# Patient Record
Sex: MALE | Race: WHITE | NOT HISPANIC OR LATINO | Employment: FULL TIME | URBAN - METROPOLITAN AREA
[De-identification: names, ages, dates, MRNs, and addresses within clinical notes are randomized per-mention and may not be internally consistent; named-entity substitution may affect disease eponyms.]

---

## 2023-12-15 ENCOUNTER — OFFICE VISIT (OUTPATIENT)
Dept: ORTHOPEDIC SURGERY | Facility: HOSPITAL | Age: 33
End: 2023-12-15
Payer: COMMERCIAL

## 2023-12-15 ENCOUNTER — HOSPITAL ENCOUNTER (OUTPATIENT)
Dept: RADIOLOGY | Facility: EXTERNAL LOCATION | Age: 33
Discharge: HOME | End: 2023-12-15

## 2023-12-15 DIAGNOSIS — M25.511 ACUTE PAIN OF RIGHT SHOULDER: Primary | ICD-10-CM

## 2023-12-15 PROCEDURE — 99214 OFFICE O/P EST MOD 30 MIN: CPT | Performed by: ORTHOPAEDIC SURGERY

## 2023-12-15 PROCEDURE — 99204 OFFICE O/P NEW MOD 45 MIN: CPT | Performed by: ORTHOPAEDIC SURGERY

## 2023-12-15 NOTE — PROGRESS NOTES
R torn pectoral 2nd opinion   Very pleasant right-hand-dominant 33-year-old male who works out 6 days a week and felt a pop approximately 1 month ago.  He was diagnosed with a pectoralis major rupture.  He underwent surgery on Tuesday.  Unfortunately he was told by his original surgeon that was not repairable and they did not have graft at that time.  He was given options of living with that considering revision with a possible allograft.  He is here today to talk more about that.  He has no numbness tingling.  His priorities are working out.  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    Physical examination examination of the left shoulder today reveals the skin to be intact. There is no sign any atrophy lesions or abrasions. There is no pain to palpation of the bony prominences. Cervical lymphadenopathy examined, and this was negative.    Patient had 5 out of 5 wrist flexors extension thumb extension bilaterally. Sensation was intact to light touch to median ulnar radial axillary and musculocutaneous nerves bilaterally. Positive radial pulse bilaterally. Provocative maneuvers on the left side today were negative.    Range of motion of the left shoulder revealed 0-170° of forward elevation. 0-60° of external rotation. Internal rotation was to T 12.  Examination of the right shoulder reveals a incision in the axilla.  He has got significant bruising around that area.  Neurologically intact very muscular gentleman.  Because of all the postop bruising is very difficult to assess cosmesis.    X-rays are within normal limits    Assessment and plan patient with a subacute pectoralis major rupture who underwent an unsuccessful surgery earlier this week here for second opinion    This point we a long discussion about his options.  He does have an option of living with it.  He understands cosmesis and strength is unlikely to be where he would like it to be.  We cannot make him a normal shoulder at this point.  I think a second surgery  to consider either primary repair or repair with allograft is a reasonable option.  I do not know the timeline of this.  I do not have availability in the next week.  I did let him know that I would work with my partners to determine if there is anyone that was available to consider this.  He understands all his options.  He understands the risk of infection bleeding nerve injury failure of the pectoralis major repair.  Will do our very best to see what we can do for him.  Plan at this point will be for right pectoralis major repair with possible allograft.

## 2023-12-19 DIAGNOSIS — M25.511 ACUTE PAIN OF RIGHT SHOULDER: Primary | ICD-10-CM

## 2023-12-19 NOTE — PROGRESS NOTES
Reviewed needs mri of chest stat to assess his pec major that had an attempted repair for surgery next week

## 2023-12-20 NOTE — PROGRESS NOTES
CHIEF COMPLAINT:   Chief Complaint   Patient presents with    Right Shoulder - Follow-up     MRI review     History: 33 y.o. male presents to the office today for evaluation of his right shoulder.  The patient is right-hand dominant.  He works a sedentary job.  However, he is extremely active, goes to the gym 6 days a week.  This is very important to the patient and part of his livelihood.  The patient unfortunately sustained an injury to his right pectoralis tendon 1 month ago.  He actually underwent surgery with a different surgeon 1-1/2 weeks ago.  Unfortunately, at the time of surgery, the surgeon deemed that the tear requiring allograft, which she unfortunately did not have at the time of surgery.  Therefore, the surgeon closed the incision and transferred his care to us.  The patient has had significant bruising in the area of surgery, but has been working on range of motion of the shoulder.  He does have a deformity in his chest and has pain over the incision.  He is otherwise healthy.    Past medical history, past surgical history, medications, allergies, family history, social history, and review of systems were reviewed today.    A 12 point review of systems was negative other than as stated in the HPI.    History reviewed. No pertinent past medical history.     No Known Allergies     History reviewed. No pertinent surgical history.     No family history on file.     Social History     Socioeconomic History    Marital status: Unknown     Spouse name: Not on file    Number of children: Not on file    Years of education: Not on file    Highest education level: Not on file   Occupational History    Not on file   Tobacco Use    Smoking status: Never     Passive exposure: Never    Smokeless tobacco: Never   Substance and Sexual Activity    Alcohol use: Not on file    Drug use: Not on file    Sexual activity: Not on file   Other Topics Concern    Not on file   Social History Narrative    Not on file     Social  Determinants of Health     Financial Resource Strain: Not on file   Food Insecurity: Not on file   Transportation Needs: Not on file   Physical Activity: Not on file   Stress: Not on file   Social Connections: Not on file   Intimate Partner Violence: Not on file   Housing Stability: Not on file        CURRENT MEDICATIONS:   No current outpatient medications on file.     No current facility-administered medications for this visit.       Physical Examination:       No data to display               There is no height or weight on file to calculate BMI.    Well-appearing, appears stated age, pleasant and cooperative, appropriate mood and behavior. Height and weight reviewed. Alert and oriented x3.  Auditory function intact.  No acute distress.  Intact ocular function, ABILIO, EOMI. Breathing is unlabored .  There is no evidence of jugular venous distension. Skin appearance is normal without evidence of rash or other lesions. 2+ radial pulses bilaterally, fingers pink and wwp, good capillary refill, no pitting edema. No appreciable lymphadenopathy in bilateral upper extremities. SILT throughout both upper extremities, median/radial/ulnar/musculocutaneous/axillary nerve motor and sensory intact (except for abnormalities noted in focused musculoskeletal exam section below).     Neck exam: Full range of motion of the neck in flexion/extension and rotational movements. No significant areas of tenderness to palpation in the neck.    On exam of bilateral upper extremities, obvious deformity of the right pectoralis muscle bulk compared to the left side with a drop nipple sign.  The contour of the pectoralis muscle in the armpit is still palpable, but not as firm as the contralateral side.  Significant bruising throughout the arm.  Prior incision near the axilla is well-healed with some dried skin glue over top.  Demonstrates full range of motion of the shoulder today.    Imaging: Chest MRI was reviewed today.  Personally  interpreted by myself.  There is evidence for a complex tear of the pectoralis major.  I do believe that the clavicular head of the pectoralis major tendon is still intact, and this is just a sternal head tear.  There is significant edema throughout the chest.  The tear does not appear to be musculotendinous in nature.  The other structures of the shoulder are intact.    Assessment: Right subacute pectoralis major tendon tear    Plan: I had a very long discussion with the patient about treatment options diagnosis.  He is in a very unfortunate situation and that he is 4 weeks out from injury now, and already had an attempted repair for his pectoralis tendon, but this was unable to be done, due to the prior surgeon not having allograft available.  He examines with the pectoralis deficiency today and MRI confirms this.  I discussed with the patient that this is a very complicated situation.  I discussed that revision surgery is reasonable, and we will have allograft available.  I do think that there are 3 possible outcomes at time of surgery.  Will either be able to do a direct repair, we do need to do a repair with allograft, or he may have a truly irreparable pectoralis tear.  I did state that there are some musculotendinous junction tears of the pectoralis that really cannot be adequately fixed with suture, and unable to be repaired.  However, I am hopeful that we will able to do either a direct repair or a repair with allograft.  We will be ready for all possibilities.  Given the patient's activity level, I do think that he will do best with the repair, versus nonoperative management.  I did state that after this surgery if we are not able to get it repaired, then this will be deemed a truly not fixable tear.  He voiced understanding of all this and wished to proceed.    Risks and recovery after surgery were discussed.  The proposed procedure will be a reverse shoulder arthroplasty.  Risks of surgery include  bleeding, infection, failure to achieve repair, neurovascular injury, persistent pain, implant failure, wound complications and possible need for further surgery. All surgeries that are performed under anesthesia also have the risks of DVTs, pulmonary embolism, potentially death. Per anesthesia's evaluation, the patient will have a nerve block, the risks of which the anesthesia team will discuss with the patient.   Patient voiced understanding of these risks and wished to proceed.     The patient will need PATs which will also include a CBC, BMP, PT/INR and a full work up by the PAT team as well as anesthesia evaluation.  My office will work to get this scheduled. I will see them back 2 weeks after surgery.    Dragon software was used to dictate this note, please be aware that minor errors in transcription may be present.    Ike Merlos MD    Shoulder/Elbow Surgery  [facility]

## 2023-12-21 ENCOUNTER — HOSPITAL ENCOUNTER (OUTPATIENT)
Dept: RADIOLOGY | Facility: HOSPITAL | Age: 33
Discharge: HOME | End: 2023-12-21
Payer: COMMERCIAL

## 2023-12-21 DIAGNOSIS — M25.511 ACUTE PAIN OF RIGHT SHOULDER: ICD-10-CM

## 2023-12-21 PROCEDURE — 71550 MRI CHEST W/O DYE: CPT | Performed by: RADIOLOGY

## 2023-12-21 PROCEDURE — 71550 MRI CHEST W/O DYE: CPT

## 2023-12-22 ENCOUNTER — OFFICE VISIT (OUTPATIENT)
Dept: ORTHOPEDIC SURGERY | Facility: CLINIC | Age: 33
End: 2023-12-22
Payer: COMMERCIAL

## 2023-12-22 ENCOUNTER — PREP FOR PROCEDURE (OUTPATIENT)
Dept: ORTHOPEDIC SURGERY | Facility: CLINIC | Age: 33
End: 2023-12-22

## 2023-12-22 DIAGNOSIS — S29.011A TRAUMATIC RUPTURE OF RIGHT PECTORALIS MAJOR TENDON: Primary | ICD-10-CM

## 2023-12-22 PROCEDURE — 99214 OFFICE O/P EST MOD 30 MIN: CPT | Performed by: STUDENT IN AN ORGANIZED HEALTH CARE EDUCATION/TRAINING PROGRAM

## 2023-12-22 PROCEDURE — 1036F TOBACCO NON-USER: CPT | Performed by: STUDENT IN AN ORGANIZED HEALTH CARE EDUCATION/TRAINING PROGRAM

## 2023-12-22 ASSESSMENT — PAIN SCALES - GENERAL: PAINLEVEL_OUTOF10: 3

## 2023-12-22 ASSESSMENT — PAIN - FUNCTIONAL ASSESSMENT: PAIN_FUNCTIONAL_ASSESSMENT: 0-10

## 2023-12-22 ASSESSMENT — PAIN DESCRIPTION - DESCRIPTORS: DESCRIPTORS: ACHING;SORE

## 2023-12-26 ENCOUNTER — ANESTHESIA EVENT (OUTPATIENT)
Dept: OPERATING ROOM | Facility: HOSPITAL | Age: 33
End: 2023-12-26
Payer: COMMERCIAL

## 2023-12-27 RX ORDER — TRANEXAMIC ACID 100 MG/ML
1000 INJECTION, SOLUTION INTRAVENOUS ONCE
Status: CANCELLED | OUTPATIENT
Start: 2023-12-27 | End: 2023-12-27

## 2023-12-27 NOTE — DISCHARGE INSTRUCTIONS
Shoulder and Elbow Service  Ike Merlos MD    Discharge Instructions after Open Shoulder Surgery      You have a bandage over your shoulder. DO NOT REMOVE THIS BANDAGE.     Remain in your sling at all times. This includes sleeping in your sling. *You may come out of your sling for daily exercises only.     Do not actively move your shoulder during this time. Active reaching and lifting away from the body are NOT permitted.    You may use the operative arm for activities of daily living that do not require the  operative arm to leave the side of the body. Such as: eating, drinking and bathing.    Remain non-weight bearing with the operative arm until you are seen post operatively.    Remove your arm from the sling to perform active elbow, wrist and hand range of motion exercises 4-5x per day. This will help alleviate joint stiffness and swelling in your arm and hand.    Use cryotherapy machine or ice on the shoulder intermittently over the first 72 hours up to the first 2 weeks following surgery.   Pain medication has been prescribed for you. Use your medicine liberally over the first 72 hours and only take if you are experiencing pain. You can then begin to taper your        use. You may take Extra Strength Tylenol or Tylenol only in place of the pain pills.   *DO NOT TAKE ANY nonsteroidal anti-inflammatory pain medications: Advil, Motrin, Ibuprofen, Aleve, Naproxen or Naprosyn. *UNLESS PRESCRIBED   You may shower after surgery. The dressing is waterproof.   Do not scrub the dressing, just let water run over it. Pat it dry.    Aspirin has been prescribed to prevent blood clots. Take one aspirin (81 mg) tablet once per day for 2 weeks after surgery, unless you have an aspirin sensitivity or allergy,      asthma or are on blood thinners. If Coumadin, Plavix, Xarelto or other blood thinning medication is prescribed for blood clot prevention, take this medication as directed by        your medical clearance  physician.   *DO NOT TAKE ASPRIN AND ANOTHER BLOOD THINNER!!    Please call the office at [phone] for any problems. Including the following:  - Excessive redness of the incision  - ANY drainage at or around incision  - Fever of more than 101.5 F    Please call [phone] to make a follow-up appointment to be seen at the [facility] or [place] [facility]. You should see Dr Merlos 10-14 days after your surgical procedure.     [facility]   [place] [facility]  [address] [place] [address]  [phone]

## 2023-12-28 ENCOUNTER — ANESTHESIA (OUTPATIENT)
Dept: OPERATING ROOM | Facility: HOSPITAL | Age: 33
End: 2023-12-28
Payer: COMMERCIAL

## 2023-12-28 ENCOUNTER — HOSPITAL ENCOUNTER (OUTPATIENT)
Facility: HOSPITAL | Age: 33
Setting detail: OUTPATIENT SURGERY
Discharge: HOME | End: 2023-12-28
Attending: STUDENT IN AN ORGANIZED HEALTH CARE EDUCATION/TRAINING PROGRAM | Admitting: STUDENT IN AN ORGANIZED HEALTH CARE EDUCATION/TRAINING PROGRAM
Payer: COMMERCIAL

## 2023-12-28 VITALS
OXYGEN SATURATION: 94 % | BODY MASS INDEX: 26.21 KG/M2 | HEIGHT: 73 IN | RESPIRATION RATE: 18 BRPM | WEIGHT: 197.75 LBS | HEART RATE: 74 BPM | TEMPERATURE: 97.7 F | SYSTOLIC BLOOD PRESSURE: 158 MMHG | DIASTOLIC BLOOD PRESSURE: 75 MMHG

## 2023-12-28 DIAGNOSIS — S29.011A TRAUMATIC RUPTURE OF RIGHT PECTORALIS MAJOR TENDON: Primary | ICD-10-CM

## 2023-12-28 PROCEDURE — C1713 ANCHOR/SCREW BN/BN,TIS/BN: HCPCS | Performed by: STUDENT IN AN ORGANIZED HEALTH CARE EDUCATION/TRAINING PROGRAM

## 2023-12-28 PROCEDURE — 2500000004 HC RX 250 GENERAL PHARMACY W/ HCPCS (ALT 636 FOR OP/ED): Performed by: ANESTHESIOLOGIST ASSISTANT

## 2023-12-28 PROCEDURE — 2720000007 HC OR 272 NO HCPCS: Performed by: STUDENT IN AN ORGANIZED HEALTH CARE EDUCATION/TRAINING PROGRAM

## 2023-12-28 PROCEDURE — 2500000004 HC RX 250 GENERAL PHARMACY W/ HCPCS (ALT 636 FOR OP/ED): Performed by: STUDENT IN AN ORGANIZED HEALTH CARE EDUCATION/TRAINING PROGRAM

## 2023-12-28 PROCEDURE — 3700000001 HC GENERAL ANESTHESIA TIME - INITIAL BASE CHARGE: Performed by: STUDENT IN AN ORGANIZED HEALTH CARE EDUCATION/TRAINING PROGRAM

## 2023-12-28 PROCEDURE — 2780000003 HC OR 278 NO HCPCS: Performed by: STUDENT IN AN ORGANIZED HEALTH CARE EDUCATION/TRAINING PROGRAM

## 2023-12-28 PROCEDURE — 3600000003 HC OR TIME - INITIAL BASE CHARGE - PROCEDURE LEVEL THREE: Performed by: STUDENT IN AN ORGANIZED HEALTH CARE EDUCATION/TRAINING PROGRAM

## 2023-12-28 PROCEDURE — 24341 RPR TDN/MUSC UPR A/E EACH: CPT | Performed by: STUDENT IN AN ORGANIZED HEALTH CARE EDUCATION/TRAINING PROGRAM

## 2023-12-28 PROCEDURE — 7100000009 HC PHASE TWO TIME - INITIAL BASE CHARGE: Performed by: STUDENT IN AN ORGANIZED HEALTH CARE EDUCATION/TRAINING PROGRAM

## 2023-12-28 PROCEDURE — 7100000001 HC RECOVERY ROOM TIME - INITIAL BASE CHARGE: Performed by: STUDENT IN AN ORGANIZED HEALTH CARE EDUCATION/TRAINING PROGRAM

## 2023-12-28 PROCEDURE — 3700000002 HC GENERAL ANESTHESIA TIME - EACH INCREMENTAL 1 MINUTE: Performed by: STUDENT IN AN ORGANIZED HEALTH CARE EDUCATION/TRAINING PROGRAM

## 2023-12-28 PROCEDURE — 3600000008 HC OR TIME - EACH INCREMENTAL 1 MINUTE - PROCEDURE LEVEL THREE: Performed by: STUDENT IN AN ORGANIZED HEALTH CARE EDUCATION/TRAINING PROGRAM

## 2023-12-28 PROCEDURE — 7100000002 HC RECOVERY ROOM TIME - EACH INCREMENTAL 1 MINUTE: Performed by: STUDENT IN AN ORGANIZED HEALTH CARE EDUCATION/TRAINING PROGRAM

## 2023-12-28 PROCEDURE — 7100000010 HC PHASE TWO TIME - EACH INCREMENTAL 1 MINUTE: Performed by: STUDENT IN AN ORGANIZED HEALTH CARE EDUCATION/TRAINING PROGRAM

## 2023-12-28 PROCEDURE — 2500000005 HC RX 250 GENERAL PHARMACY W/O HCPCS: Performed by: ANESTHESIOLOGIST ASSISTANT

## 2023-12-28 DEVICE — IMPLANTABLE DEVICE: Type: IMPLANTABLE DEVICE | Site: SHOULDER | Status: FUNCTIONAL

## 2023-12-28 DEVICE — SP FIBERTAK RC, DBLOAD TAPE BL/W, BLK/W
Type: IMPLANTABLE DEVICE | Site: SHOULDER | Status: FUNCTIONAL
Brand: ARTHREX®

## 2023-12-28 RX ORDER — MIDAZOLAM HYDROCHLORIDE 1 MG/ML
2 INJECTION, SOLUTION INTRAMUSCULAR; INTRAVENOUS ONCE
Status: COMPLETED | OUTPATIENT
Start: 2023-12-28 | End: 2023-12-28

## 2023-12-28 RX ORDER — HYDRALAZINE HYDROCHLORIDE 20 MG/ML
5 INJECTION INTRAMUSCULAR; INTRAVENOUS EVERY 30 MIN PRN
Status: DISCONTINUED | OUTPATIENT
Start: 2023-12-28 | End: 2023-12-28 | Stop reason: HOSPADM

## 2023-12-28 RX ORDER — LABETALOL HYDROCHLORIDE 5 MG/ML
5 INJECTION, SOLUTION INTRAVENOUS ONCE AS NEEDED
Status: DISCONTINUED | OUTPATIENT
Start: 2023-12-28 | End: 2023-12-28 | Stop reason: HOSPADM

## 2023-12-28 RX ORDER — DEXAMETHASONE SODIUM PHOSPHATE 4 MG/ML
INJECTION, SOLUTION INTRA-ARTICULAR; INTRALESIONAL; INTRAMUSCULAR; INTRAVENOUS; SOFT TISSUE AS NEEDED
Status: DISCONTINUED | OUTPATIENT
Start: 2023-12-28 | End: 2023-12-28

## 2023-12-28 RX ORDER — PROPOFOL 10 MG/ML
INJECTION, EMULSION INTRAVENOUS AS NEEDED
Status: DISCONTINUED | OUTPATIENT
Start: 2023-12-28 | End: 2023-12-28

## 2023-12-28 RX ORDER — LIDOCAINE HYDROCHLORIDE 10 MG/ML
INJECTION, SOLUTION EPIDURAL; INFILTRATION; INTRACAUDAL; PERINEURAL AS NEEDED
Status: DISCONTINUED | OUTPATIENT
Start: 2023-12-28 | End: 2023-12-28

## 2023-12-28 RX ORDER — FENTANYL CITRATE 50 UG/ML
INJECTION, SOLUTION INTRAMUSCULAR; INTRAVENOUS AS NEEDED
Status: DISCONTINUED | OUTPATIENT
Start: 2023-12-28 | End: 2023-12-28

## 2023-12-28 RX ORDER — PROCHLORPERAZINE EDISYLATE 5 MG/ML
5 INJECTION INTRAMUSCULAR; INTRAVENOUS ONCE AS NEEDED
Status: DISCONTINUED | OUTPATIENT
Start: 2023-12-28 | End: 2023-12-28 | Stop reason: HOSPADM

## 2023-12-28 RX ORDER — SODIUM CHLORIDE, SODIUM LACTATE, POTASSIUM CHLORIDE, CALCIUM CHLORIDE 600; 310; 30; 20 MG/100ML; MG/100ML; MG/100ML; MG/100ML
100 INJECTION, SOLUTION INTRAVENOUS CONTINUOUS
Status: DISCONTINUED | OUTPATIENT
Start: 2023-12-28 | End: 2023-12-28 | Stop reason: HOSPADM

## 2023-12-28 RX ORDER — ONDANSETRON HYDROCHLORIDE 2 MG/ML
INJECTION, SOLUTION INTRAVENOUS AS NEEDED
Status: DISCONTINUED | OUTPATIENT
Start: 2023-12-28 | End: 2023-12-28

## 2023-12-28 RX ORDER — ALBUTEROL SULFATE 0.83 MG/ML
2.5 SOLUTION RESPIRATORY (INHALATION) ONCE AS NEEDED
Status: DISCONTINUED | OUTPATIENT
Start: 2023-12-28 | End: 2023-12-28 | Stop reason: HOSPADM

## 2023-12-28 RX ORDER — ASPIRIN 81 MG/1
81 TABLET ORAL DAILY
Qty: 14 TABLET | Refills: 0 | Status: SHIPPED | OUTPATIENT
Start: 2023-12-28 | End: 2024-01-11

## 2023-12-28 RX ORDER — ROCURONIUM BROMIDE 10 MG/ML
INJECTION, SOLUTION INTRAVENOUS AS NEEDED
Status: DISCONTINUED | OUTPATIENT
Start: 2023-12-28 | End: 2023-12-28

## 2023-12-28 RX ORDER — OXYCODONE HYDROCHLORIDE 5 MG/1
5 TABLET ORAL EVERY 6 HOURS PRN
Qty: 28 TABLET | Refills: 0 | Status: SHIPPED | OUTPATIENT
Start: 2023-12-28 | End: 2024-01-04

## 2023-12-28 RX ORDER — NEOSTIGMINE METHYLSULFATE 1 MG/ML
INJECTION, SOLUTION INTRAVENOUS AS NEEDED
Status: DISCONTINUED | OUTPATIENT
Start: 2023-12-28 | End: 2023-12-28

## 2023-12-28 RX ORDER — FENTANYL CITRATE 50 UG/ML
25 INJECTION, SOLUTION INTRAMUSCULAR; INTRAVENOUS EVERY 5 MIN PRN
Status: DISCONTINUED | OUTPATIENT
Start: 2023-12-28 | End: 2023-12-28 | Stop reason: HOSPADM

## 2023-12-28 RX ORDER — VANCOMYCIN HYDROCHLORIDE 1 G/20ML
INJECTION, POWDER, LYOPHILIZED, FOR SOLUTION INTRAVENOUS AS NEEDED
Status: DISCONTINUED | OUTPATIENT
Start: 2023-12-28 | End: 2023-12-28 | Stop reason: HOSPADM

## 2023-12-28 RX ORDER — MEPERIDINE HYDROCHLORIDE 25 MG/ML
12.5 INJECTION INTRAMUSCULAR; INTRAVENOUS; SUBCUTANEOUS EVERY 10 MIN PRN
Status: DISCONTINUED | OUTPATIENT
Start: 2023-12-28 | End: 2023-12-28 | Stop reason: HOSPADM

## 2023-12-28 RX ORDER — ACETAMINOPHEN 500 MG
1000 TABLET ORAL EVERY 6 HOURS PRN
Qty: 30 TABLET | Refills: 2 | Status: SHIPPED | OUTPATIENT
Start: 2023-12-28 | End: 2024-01-08

## 2023-12-28 RX ORDER — DIPHENHYDRAMINE HYDROCHLORIDE 50 MG/ML
12.5 INJECTION INTRAMUSCULAR; INTRAVENOUS ONCE AS NEEDED
Status: DISCONTINUED | OUTPATIENT
Start: 2023-12-28 | End: 2023-12-28 | Stop reason: HOSPADM

## 2023-12-28 RX ORDER — FENTANYL CITRATE 50 UG/ML
50 INJECTION, SOLUTION INTRAMUSCULAR; INTRAVENOUS ONCE
Status: COMPLETED | OUTPATIENT
Start: 2023-12-28 | End: 2023-12-28

## 2023-12-28 RX ORDER — IPRATROPIUM BROMIDE 0.5 MG/2.5ML
500 SOLUTION RESPIRATORY (INHALATION) AS NEEDED
Status: DISCONTINUED | OUTPATIENT
Start: 2023-12-28 | End: 2023-12-28 | Stop reason: HOSPADM

## 2023-12-28 RX ORDER — ONDANSETRON HYDROCHLORIDE 2 MG/ML
4 INJECTION, SOLUTION INTRAVENOUS ONCE AS NEEDED
Status: DISCONTINUED | OUTPATIENT
Start: 2023-12-28 | End: 2023-12-28 | Stop reason: HOSPADM

## 2023-12-28 RX ORDER — CEFAZOLIN SODIUM 2 G/100ML
2 INJECTION, SOLUTION INTRAVENOUS ONCE
Status: COMPLETED | OUTPATIENT
Start: 2023-12-28 | End: 2023-12-28

## 2023-12-28 RX ORDER — GLYCOPYRROLATE 0.2 MG/ML
INJECTION INTRAMUSCULAR; INTRAVENOUS AS NEEDED
Status: DISCONTINUED | OUTPATIENT
Start: 2023-12-28 | End: 2023-12-28

## 2023-12-28 RX ORDER — FENTANYL CITRATE 50 UG/ML
50 INJECTION, SOLUTION INTRAMUSCULAR; INTRAVENOUS EVERY 5 MIN PRN
Status: DISCONTINUED | OUTPATIENT
Start: 2023-12-28 | End: 2023-12-28 | Stop reason: HOSPADM

## 2023-12-28 RX ADMIN — CEFAZOLIN SODIUM 2 G: 2 INJECTION, SOLUTION INTRAVENOUS at 09:36

## 2023-12-28 RX ADMIN — GLYCOPYRROLATE 0.4 MG: 0.2 INJECTION INTRAMUSCULAR; INTRAVENOUS at 11:22

## 2023-12-28 RX ADMIN — FENTANYL CITRATE 50 MCG: 50 INJECTION INTRAMUSCULAR; INTRAVENOUS at 09:17

## 2023-12-28 RX ADMIN — NEOSTIGMINE METHYLSULFATE 3 MG: 1 INJECTION INTRAVENOUS at 11:22

## 2023-12-28 RX ADMIN — ONDANSETRON 4 MG: 2 INJECTION INTRAMUSCULAR; INTRAVENOUS at 11:13

## 2023-12-28 RX ADMIN — DEXAMETHASONE SODIUM PHOSPHATE 4 MG: 4 INJECTION, SOLUTION INTRAMUSCULAR; INTRAVENOUS at 09:49

## 2023-12-28 RX ADMIN — ROCURONIUM BROMIDE 5 MG: 10 INJECTION, SOLUTION INTRAVENOUS at 11:01

## 2023-12-28 RX ADMIN — FENTANYL CITRATE 50 MCG: 50 INJECTION INTRAMUSCULAR; INTRAVENOUS at 11:13

## 2023-12-28 RX ADMIN — ROCURONIUM BROMIDE 10 MG: 10 INJECTION, SOLUTION INTRAVENOUS at 10:21

## 2023-12-28 RX ADMIN — MIDAZOLAM 2 MG: 1 INJECTION INTRAMUSCULAR; INTRAVENOUS at 09:17

## 2023-12-28 RX ADMIN — FENTANYL CITRATE 25 MCG: 50 INJECTION INTRAMUSCULAR; INTRAVENOUS at 11:20

## 2023-12-28 RX ADMIN — ROCURONIUM BROMIDE 50 MG: 10 INJECTION, SOLUTION INTRAVENOUS at 09:34

## 2023-12-28 RX ADMIN — LIDOCAINE HYDROCHLORIDE 5 ML: 10 INJECTION, SOLUTION EPIDURAL; INFILTRATION; INTRACAUDAL; PERINEURAL at 09:34

## 2023-12-28 RX ADMIN — SODIUM CHLORIDE, SODIUM LACTATE, POTASSIUM CHLORIDE, AND CALCIUM CHLORIDE 100 ML/HR: 600; 310; 30; 20 INJECTION, SOLUTION INTRAVENOUS at 08:26

## 2023-12-28 RX ADMIN — ROCURONIUM BROMIDE 10 MG: 10 INJECTION, SOLUTION INTRAVENOUS at 10:40

## 2023-12-28 RX ADMIN — PROPOFOL 200 MG: 10 INJECTION, EMULSION INTRAVENOUS at 09:34

## 2023-12-28 RX ADMIN — FENTANYL CITRATE 25 MCG: 50 INJECTION INTRAMUSCULAR; INTRAVENOUS at 11:22

## 2023-12-28 SDOH — HEALTH STABILITY: MENTAL HEALTH: CURRENT SMOKER: 0

## 2023-12-28 ASSESSMENT — PAIN SCALES - GENERAL
PAINLEVEL_OUTOF10: 4
PAINLEVEL_OUTOF10: 0 - NO PAIN
PAINLEVEL_OUTOF10: 0 - NO PAIN
PAINLEVEL_OUTOF10: 4
PAINLEVEL_OUTOF10: 5 - MODERATE PAIN
PAINLEVEL_OUTOF10: 4

## 2023-12-28 ASSESSMENT — COLUMBIA-SUICIDE SEVERITY RATING SCALE - C-SSRS
2. HAVE YOU ACTUALLY HAD ANY THOUGHTS OF KILLING YOURSELF?: NO
1. IN THE PAST MONTH, HAVE YOU WISHED YOU WERE DEAD OR WISHED YOU COULD GO TO SLEEP AND NOT WAKE UP?: NO
6. HAVE YOU EVER DONE ANYTHING, STARTED TO DO ANYTHING, OR PREPARED TO DO ANYTHING TO END YOUR LIFE?: NO

## 2023-12-28 ASSESSMENT — PAIN - FUNCTIONAL ASSESSMENT
PAIN_FUNCTIONAL_ASSESSMENT: 0-10

## 2023-12-28 NOTE — ANESTHESIA PREPROCEDURE EVALUATION
Patient: Miguel Early    Procedure Information       Date/Time: 12/28/23 3956    Procedure: Pectoralis major repair (preop block, beach chair, arthrex anchors, achilles allograft and dermal allograft available) (Right: Shoulder) - Patient is otherwise healthy, PAT if time, otherwise can skip it. Cannot delay, he had failed surgery previously and needs this done urgently.    Location: [facility] OR 04 / [facility] OR    Surgeons: Ike Merlos MD            Relevant Problems   Anesthesia (within normal limits)      Cardiovascular   (-) History of coronary artery bypass graft   (-) Pacemaker      Endocrine   (-) Diabetes mellitus, type 2 (CMS/[facility])      Neuro/Psych   (-) CVA (cerebral vascular accident) (CMS/[facility])   (-) Seizures (CMS/[facility])      Pulmonary   (-) Asthma   (-) Chronic obstructive pulmonary disease (CMS/[facility])   (-) DILIP (obstructive sleep apnea)      Hematology   (-) Coagulopathy (CMS/[facility])       Clinical information reviewed:   Tobacco  Allergies  Meds  Problems  Med Hx  Surg Hx   Fam Hx  Soc   Hx        NPO Detail:  NPO/Void Status  Date of Last Liquid: 12/28/23  Time of Last Liquid: 2100  Date of Last Solid: 12/28/23  Time of Last Solid: 2100  Time of Last Void: 0600         Physical Exam    Airway  Mallampati: I  TM distance: >3 FB  Neck ROM: full     Cardiovascular    Dental    Pulmonary    Abdominal            Anesthesia Plan    ASA 1     general and regional     The patient is not a current smoker.    intravenous induction   Postoperative administration of opioids is intended.  Anesthetic plan and risks discussed with patient.  Use of blood products discussed with patient who consented to blood products.

## 2023-12-28 NOTE — PERIOPERATIVE NURSING NOTE
"Pt fully awake, tolerating PO intake, VS baseline. Pt states he \"does not like to take anything for pain,\" is tolerable of post op discomfort @ this time. Attending anesthesia assessed pt post op.  Post op pain control and expectations reviewed with pt.  "
Psych/Behavioral

## 2023-12-28 NOTE — ANESTHESIA PROCEDURE NOTES
Airway  Date/Time: 12/28/2023 9:35 AM  Urgency: elective      Staffing  Performed: [facility]   Authorized by: Neri Rose MD    Performed by: [facility] Mckinney  Patient location during procedure: OR    Indications and Patient Condition  Indications for airway management: anesthesia  Spontaneous Ventilation: absent  Sedation level: deep  Preoxygenated: yes  Mask difficulty assessment: 1 - vent by mask    Final Airway Details  Final airway type: endotracheal airway      Successful airway: ETT  Cuffed: yes   Successful intubation technique: direct laryngoscopy  Facilitating devices/methods: intubating stylet  Blade: Eulalia  ETT size (mm): 7.5  Cormack-Lehane Classification: grade I - full view of glottis  Placement verified by: chest auscultation and capnometry   Measured from: teeth  Number of attempts at approach: 1  Number of other approaches attempted: 1

## 2023-12-28 NOTE — BRIEF OP NOTE
Date: 2023  OR Location: [facility] OR    Name: Miguel Early, : 1990, Age: 33 y.o., MRN: [id number], Sex: male    Diagnosis  Pre-op Diagnosis     * Traumatic rupture of right pectoralis major tendon [S29.011A] Post-op Diagnosis     * Traumatic rupture of right pectoralis major tendon [S29.011A]     Procedures  Pectoralis major repair (preop block, beach chair, arthrex anchors, achilles allograft and dermal allograft available)  [id number] - NM REPAIR TENDON/MUSCLE UPPER ARM/ELBOW EA TDN/[facility]      Surgeons      * Ike Merlos - Primary    Resident/Fellow/Other Assistant:  Surgeon(s) and Role:    Procedure Summary  Anesthesia: Consult  ASA: I  Anesthesia Staff: Anesthesiologist: Neri Rose MD  C-AA: [facility] Mckinney  Estimated Blood Loss: 50mL  Intra-op Medications:   Medication Name Total Dose   vancomycin (Vancocin) vial for injection 1 g   lactated Ringer's infusion 1,159.33 mL   ceFAZolin in dextrose (iso-os) (Ancef) IVPB 2 g 2 g              Anesthesia Record               Intraprocedure I/O Totals          Intake    lactated Ringer's infusion 1350.00 mL    ceFAZolin in dextrose (iso-os) (Ancef) IVPB 2 g 100.00 mL    Total Intake 1450 mL          Specimen: No specimens collected     Staff:   Circulator: iLa Irving RN  Relief Circulator: Farheen Snow RN  Relief Scrub: Venecia Morocho  Scrub Person: Shanti Saldana PA-C; Magdalena Velasquez; Delicia Everett          Findings: right shoulder pectoralis major tendon rupture    Complications:  None; patient tolerated the procedure well.     Disposition: PACU - hemodynamically stable.  Condition: stable  Specimens Collected: No specimens collected  Attending Attestation:  Dr. Merlos was present and scrubbed for all critical portions of the case.

## 2023-12-28 NOTE — OP NOTE
Pectoralis major repair (preop block, beach chair, arthrex anchors, achilles allograft and dermal allograft available) (R) Operative Note     Date: 2023  OR Location: [facility]    Name: Miguel Early, : 1990, Age: 33 y.o., MRN: [id number], Sex: male    Diagnosis  Pre-op Diagnosis     * Traumatic rupture of right pectoralis major tendon [S29.011A] Post-op Diagnosis     * Traumatic rupture of right pectoralis major tendon [S29.011A]     Procedures  Pectoralis major repair (preop block, beach chair, arthrex anchors, achilles allograft and dermal allograft available)  20220 - [place] REPAIR TENDON/MUSCLE UPPER ARM/ELBOW EA TDN/MUSC      Surgeons      * Ike Merlos - Primary    Resident/Fellow/Other Assistant:  Surgeon(s) and Role:    Procedure Summary  Anesthesia: Consult  ASA: I  Anesthesia Staff: Anesthesiologist: Neri Rose MD  C-AA: ALEC Mckinney  Estimated Blood Loss: 50 mL  Intra-op Medications:   Medication Name Total Dose   vancomycin (Vancocin) vial for injection 1 g   lactated Ringer's infusion 1,159.33 mL   ceFAZolin in dextrose (iso-os) (Ancef) IVPB 2 g 2 g              Anesthesia Record               Intraprocedure I/O Totals          Intake    lactated Ringer's infusion 1350.00 mL    ceFAZolin in dextrose (iso-os) (Ancef) IVPB 2 g 100.00 mL    Total Intake 1450 mL          Specimen: No specimens collected     Staff:   Circulator: Lia Irving RN  Relief Circulator: Farheen Snow RN  Relief Scrub: Venecia Morocho  Scrub Person: Shanti Saldana PA-C; Magdalena Velasquez; Delicia Everett         Drains and/or Catheters: * None in log *    Tourniquet Times:         Implants:  Implants       Type Name Action Serial No.       KEISHA TENDON Implanted [id number]     Implant ANCHOR, FIBERTAK, W/ TWO 1.3MM SUTURE TAPE, (WH/BL/ & WH/BLK - [id number] Implanted      Implant ANCHOR, FIBERTAK, W/ TWO 1.3MM SUTURE TAPE, (WH/BL/ & /BLK - [id number] Implanted            INDICATIONS FOR  PROCEDURE: The patient was diagnosed with pectoralis major tear of the right shoulder. Patient was having severe limitations in daily function as well as consistent pain after attempting nonoperative options. Treatment options were discussed. The patient was proposed to have a pectoralis major repair, possibly with allograft reconstruction and other procedures as indicated. Risks and benefits of surgery and alternatives of treatment were discussed.  The patient understood all the risks and benefits and wanted to proceed with surgical option.  Of importance, the patient did undergo an attempted pectoralis repair with a different surgeon about 2 weeks ago and the other surgeon was unable to get the tendon repaired.  Therefore, this is a revision procedure with added complexity.    DESCRIPTION OF PROCEDURE: Patient was met in the preoperative holding area. Consent for surgery was reviewed and the correct operative extremity was verified and marked. The patient then underwent a preoperative nerve block, please see anesthesia records regarding this. The patient was then brought to the operating room and was placed in a supine position on the operating table.  SCDs were placed for DVT prophylaxis. General anesthesia was induced. The patient was placed in a beach chair position to around 30 degrees of inclination, and all the bony prominences were well padded. The operative upper extremity was prepped and draped in usual sterile fashion.  A time out was held confirming the correct patient, operative side, operative procedure, preoperative antibiotics, preoperative TXA, implants being present and that it was safe to proceed--all were in agreement it was safe to proceed.    Attention was directed to the operative shoulder.  A deltopectoral incision was made from just below the coracoid to the deltoid insertion.  Skin was incised with a knife and the deep dermal layer was incised using electrocautery.  Dissection was carried  out through the subcutaneous tissue to the level of the deltoid fascia.  The skin flaps medially and laterally.  Immediately, there was seroma from the fibular border of the pectoralis major tendon.  The clavicular portion of the pectoralis tendon was intact.  There was a significant tear of the sternal costal insertion of the pectoralis tendon, although a small portion of the sternal costal attachment was still intact as well.  With careful dissection, we identified the retracted and torn portion of the pectoralis major tendon.  There was significant scarring and adhesions.  We performed extensive adhesiolysis both anteriorly and posteriorly.  We did note that the tendon stump was delaminated.  After extensive release of adhesions, it was noted that the retracted tendon stump was unable to reach the humeral insertion.  Decision was then made to reconstruct the tendon with an Achilles allograft.  An Achilles allograft was defrosted and brought to the back table.  The bone block was cut off.  In a Pulvertaft weave fashion, we weaved the tendon allograft through the intact pectoralis major tendon.  Using copious sutures of #2 FiberWire, we then attached the tendon to the pectoralis major tendon and then also reenforced the allograft tendon to itself.  We used the distal portion of the allograft as the new pectoralis insertion.  Excellent fixation was achieved.  We then directed our attention underneath the clavicular head of the pectoralis major tendon and identified the lateral cortex of the humerus.  Care was taken to preserve the biceps tendon.  We placed two 2.6 all suture anchors into the lateral biceps cortex again taking care to not entrapped the biceps tendon.  Excellent fixation of the sutures was achieved.  Then using the sutures from each anchor in a [place] type fashion we secured the allograft tendon edge to the humeral bone.  Excellent fixation was achieved.  We then reinforced the allograft tendon to the  intact portion of the sternal costal attachment.  Upon final inspection, satisfactory and excellent fixation was achieved.    Copious irrigation was performed. 1 gram of vancomycin powder was placed in the wound. #1 Vicryl was used to close the deep tissue layer superficial to the deltopectoral interval, 2-0 Vicryl was used to for subcutaneous closure, and 3-0 monocryl was used to close the skin. Dermabond was applied and a sterile, water proof dressing was placed.  The patient was placed in a sling.  The patient was brought out from general anesthesia without any complications and was transported to postanesthesia care unit in good condition.    ATTESTATION: Dr. Merlos was present for the entirety of the procedure and personally performed all aspects of this procedure.    Dragon software was used to dictate this note, please be aware that minor errors in transcription may be present.    Ike Merlos MD    Shoulder/Elbow Surgery  [facility]      Complications:  None; patient tolerated the procedure well.    Disposition: PACU - hemodynamically stable.  Condition: stable         Additional Details: 22 modifier - this was a revision of an attempted and failed pectoralis major repair by another surgeon. There was extensive scarring and adhesions that I had to take down. I also had to utilize an allograft achilles tendon to reconstruct the pectoralis major tendon. This is much more intensive work than a standard primary pectoralis major repair.     Attending Attestation:     Ike Merlos  Phone Number: [phone]

## 2023-12-28 NOTE — ANESTHESIA POSTPROCEDURE EVALUATION
Patient: Miguel Early    Procedure Summary       Date: 12/28/23 Room / Location: [facility] 04 / [facility]    Anesthesia Start: 0930 Anesthesia Stop: 1137    Procedure: Pectoralis major repair (preop block, beach chair, arthrex anchors, achilles allograft and dermal allograft available) (Right: Shoulder) Diagnosis:       Traumatic rupture of right pectoralis major tendon      (Traumatic rupture of right pectoralis major tendon [S29.011A])    Surgeons: Ike Merlos MD Responsible Provider: Neri Rose MD    Anesthesia Type: general, regional ASA Status: 1            Anesthesia Type: general, regional    Vitals Value Taken Time   /70 12/28/23 1150   Temp 37 °C (98.6 °F) 12/28/23 1135   Pulse 74 12/28/23 1150   Resp 20 12/28/23 1135   SpO2 93 % 12/28/23 1150       Anesthesia Post Evaluation    Patient location during evaluation: PACU  Patient participation: complete - patient participated  Level of consciousness: awake  Pain management: adequate  Multimodal analgesia pain management approach  Airway patency: patent  Cardiovascular status: acceptable and hemodynamically stable  Respiratory status: acceptable and spontaneous ventilation  Hydration status: euvolemic  Postoperative Nausea and Vomiting: none  Comments: No nausea or vomiting        There were no known notable events for this encounter.

## 2023-12-28 NOTE — ANESTHESIA PROCEDURE NOTES
Peripheral Block    Patient location during procedure: holding area  Start time: 12/28/2023 9:24 AM  End time: 12/28/2023 9:26 AM  Reason for block: at surgeon's request and post-op pain management  Staffing  Performed: attending   Authorized by: Neri Rose MD    Performed by: Neri Rose MD  Preanesthetic Checklist  Completed: patient identified, IV checked, site marked, risks and benefits discussed, surgical consent, monitors and equipment checked, pre-op evaluation and timeout performed   Timeout performed at: 12/28/2023 9:16 AM  Peripheral Block  Patient position: sitting  Prep: ChloraPrep  Patient monitoring: heart rate, cardiac monitor and continuous pulse ox  Block type: interscalene  Laterality: right  Injection technique: single-shot  Guidance: ultrasound guided  Local infiltration: ropivacaine  Infiltration strength: 0.5 %  Dose: 20 mL  Needle  Needle gauge: 20 G  Needle length: 5 cm  Needle localization: ultrasound guidance  Assessment  Injection assessment: negative aspiration for heme, no paresthesia on injection, incremental injection and local visualized surrounding nerve on ultrasound  Heart rate change: no  Slow fractionated injection: no

## 2023-12-29 ASSESSMENT — PAIN SCALES - GENERAL: PAINLEVEL_OUTOF10: 0 - NO PAIN

## 2024-01-09 NOTE — PROGRESS NOTES
History: Patient returns to the office status post R pec allograft reconstruction on 2 weeks ago. Patient has been immobilized in a sling and pain is well controlled. Denies numbness/tingling.     Past medical history, past surgical history, medications, allergies, family history, social history, and review of systems were reviewed today and have been documented separately in this encounter.   A 12 point review of systems was negative other than as stated in the HPI.    Physical Examination:    On exam of the right upper extremity, incisions are well healed, without significant erythema or drainage, dressing removed today. Demonstrates full ROM of the elbow/wrist/hand. Neurovascularly intact throughout.    Imaging: Xrs of the operative shoulder were performed today. Implants in stable alignment. No acute complications noted.     Assessment: 2 weeks s/p R pec allograft reconstruction     Plan:   At this point, we will keep the shoulder immobilized. We will see them back in 2 weeks and start PT at that time. All questions answered. Please get new shoulder Xrs at the next visit.     Dragon software was used to dictate this note, please be aware that minor errors in transcription may be present.    Ike Merlos MD    Shoulder/Elbow Surgery  [facility]

## 2024-01-10 ENCOUNTER — OFFICE VISIT (OUTPATIENT)
Dept: ORTHOPEDIC SURGERY | Facility: CLINIC | Age: 34
End: 2024-01-10
Payer: COMMERCIAL

## 2024-01-10 DIAGNOSIS — S29.011A TRAUMATIC RUPTURE OF RIGHT PECTORALIS MAJOR TENDON: Primary | ICD-10-CM

## 2024-01-10 PROCEDURE — 99024 POSTOP FOLLOW-UP VISIT: CPT | Performed by: STUDENT IN AN ORGANIZED HEALTH CARE EDUCATION/TRAINING PROGRAM

## 2024-01-10 PROCEDURE — 1036F TOBACCO NON-USER: CPT | Performed by: STUDENT IN AN ORGANIZED HEALTH CARE EDUCATION/TRAINING PROGRAM

## 2024-01-24 ENCOUNTER — OFFICE VISIT (OUTPATIENT)
Dept: ORTHOPEDIC SURGERY | Facility: CLINIC | Age: 34
End: 2024-01-24
Payer: COMMERCIAL

## 2024-01-24 DIAGNOSIS — S29.011A TRAUMATIC RUPTURE OF RIGHT PECTORALIS MAJOR TENDON: ICD-10-CM

## 2024-01-24 PROCEDURE — 1036F TOBACCO NON-USER: CPT | Performed by: STUDENT IN AN ORGANIZED HEALTH CARE EDUCATION/TRAINING PROGRAM

## 2024-01-24 PROCEDURE — 99024 POSTOP FOLLOW-UP VISIT: CPT | Performed by: STUDENT IN AN ORGANIZED HEALTH CARE EDUCATION/TRAINING PROGRAM

## 2024-01-24 NOTE — PROGRESS NOTES
History: Patient returns to the office status post R pec allograft reconstruction on 4 weeks ago. Denies numbness/tingling. Doing well, pain controlled. Says that he is feeling good today.     Past medical history, past surgical history, medications, allergies, family history, social history, and review of systems were reviewed today and have been documented separately in this encounter.   A 12 point review of systems was negative other than as stated in the HPI.    Physical Examination:    On exam of the right upper extremity, incisions are well healed, without significant erythema or drainage. Demonstrates full ROM of the elbow/wrist/hand. Neurovascularly intact throughout. Passive ROM of the operative shoulder , ER 0, Abduction to 45.    Assessment: 4 weeks s/p R pec allograft reconstruction     Plan: We will begin formal PT at this time. Will progress from PROM to AAROM to AROM as tolerated, strengthening after ROM is full. We will see them back around 6 weeks from now. All questions answered. Please get new shoulder Xrs at the next visit.     Dragon software was used to dictate this note, please be aware that minor errors in transcription may be present.    Ike Merlos MD    Shoulder/Elbow Surgery  [facility]

## 2024-02-06 ENCOUNTER — EVALUATION (OUTPATIENT)
Dept: PHYSICAL THERAPY | Facility: HOSPITAL | Age: 34
End: 2024-02-06
Payer: COMMERCIAL

## 2024-02-06 DIAGNOSIS — M62.81 MUSCLE WEAKNESS: Primary | ICD-10-CM

## 2024-02-06 DIAGNOSIS — S29.011A TRAUMATIC RUPTURE OF RIGHT PECTORALIS MAJOR TENDON: ICD-10-CM

## 2024-02-06 PROCEDURE — 97161 PT EVAL LOW COMPLEX 20 MIN: CPT | Mod: GP | Performed by: PHYSICAL THERAPIST

## 2024-02-06 PROCEDURE — 97110 THERAPEUTIC EXERCISES: CPT | Mod: GP | Performed by: PHYSICAL THERAPIST

## 2024-02-06 PROCEDURE — 97140 MANUAL THERAPY 1/> REGIONS: CPT | Mod: GP | Performed by: PHYSICAL THERAPIST

## 2024-02-06 ASSESSMENT — PAIN SCALES - GENERAL: PAINLEVEL_OUTOF10: 0 - NO PAIN

## 2024-02-06 ASSESSMENT — PAIN - FUNCTIONAL ASSESSMENT: PAIN_FUNCTIONAL_ASSESSMENT: 0-10

## 2024-02-06 NOTE — PROGRESS NOTES
Physical Therapy  Physical Therapy Orthopedic Evaluation    Patient Name: Miguel Early  MRN: [id number]  Today's Date: 2/7/2024  Time Calculation  Start Time: 1000  Stop Time: 1125  Time Calculation (min): 85 min    Insurance:  Visit number: 1 of 20  Authorization info: No auth  Insurance Type: [employer]  POW: 5    General:  Reason for visit: S/p R pectoralis major tendon repair  Referred by: Ike Merlos    Current Problem  1. Muscle weakness        2. Traumatic rupture of right pectoralis major tendon  Referral to Physical Therapy          Precautions: None  Precautions  STEADI Fall Risk Score (The score of 4 or more indicates an increased risk of falling): 0    Medical History Form: Reviewed (scanned into chart)    Subjective:     Chief Complaint: Patient presents to clinic s/p R pec major tendon repair on 12/28/23. Presenting to physical therapy for graded return to physical activity and progressions to return to weight lifting.   Onset Date: November 2023  CADY: Insidious, bench pressing    Current Condition:   Better    Pain:  Pain Assessment: 0-10  Pain Score: 0 - No pain  Location: None. Only some stiffness over tendon insertion in the morning.  Description: N/a  Aggravating Factors: None  Relieving Factors:  None    Relevant Information (PMH & Previous Tests/Imaging): XR and MRI  Previous Interventions/Treatments: None    Prior Level of Function (PLOF)  Patient previously independent with all ADLs  Exercise/Physical Activity: Weightlifting    Patients Living Environment: Reviewed and no concern    Primary Language: English    Patient's Goal(s) for Therapy: Get stronger and return to weight lifting    Red Flags: Do you have any of the following? No  Fever/chills, unexplained weight changes, dizziness/fainting, unexplained change in bowel or bladder functions, unexplained malaise or muscle weakness, night pain/sweats, numbness or tingling    Objective:  Objective       ROM    Shoulder AROM  (Degrees)      (R)  (L)  Flexion: 165  165   Abduction: WFL  WFL     Functional ER: WFL  WFL     Functional IR: WFL  WFL  90-90 ER: 85  85         Elbow AROM (Degrees)      (R)  (L)  Flexion: WNL  WNL      Extension: WNL  WNL           Strength Testing (MMTs)    Shoulder    (R)  (L)  Flexion: 5  5      Abduction: 4+  5     ER:  5  5     IR:  5  5         Elbow    (R)  (L)  Flexion: 5  5       Extension: 5  5         Periscapular Musculature    (R)  (L)  Upper Traps: 5  5     Middle Traps: 5  5     Lower Traps: 5  5     Rhomboids: 5  5         Palpation: Minor TTP over pec major insertion    Observation: Incision healing well with no signs of infection      Outcome Measures:  Other Measures  Disability of Arm Shoulder Hand (DASH): 14 (6.82%)     EDUCATION: home exercise program, plan of care, activity modifications, pain management, and injury pathology       Goals: Set and discussed today  Active       PT Problem       PT Goals       Start:  02/07/24    Expected End:  05/01/24       Patient will demonstrate understanding of HEP within 2 visits in order to facilitate continuum of care during PT management.  Patient will demonstrate understanding of self scar mobilization within 2 visits in order to minimize adhesions to underlying tissues.   Patient will maintain R UE ROM (being symmetrical to that of the L UE) with increased physical activity, assisting with proper movement patterns and muscle activation through a full ROM.   Patient will improve R UE strength to be symmetrical to the L UE (as measured through HHD) by the end of PT POC in order to facilitate proper biomechanics with higher level athletic movements.             Plan of care was developed with input and agreement by the patient      Treatment Performed:    Therapeutic Exercise:    25 min  BFR x15 mins, R UE 50% occulusion, 8 min rounds  Banded straight arm adduction, green band 30-15-15-15  Oakes front raises 30-15-15-15  Push ups, 1x12  Observation  of form  AROM/mobility  Door/wall stretching, 2x30 secs ea  90-90 and scapular plane  Supine pec stretch with foam roller under t/s, 2x30 secs    Manual Therapy:    10 min  Cross friction massage over incision scar tissue x5 mins  STM gross distal pec major tendon and musculature x5 mins      Assessment: Patient presents with signs and symptoms consistent post-surgical R pectoralis major tendon repair, resulting in limited participation in pain-free ADLs and inability to perform at their prior level of function. Pec tendon tore traumatically while bench pressing in November. Underwent two surgical interventions, as there was excessive fraying of the musculature and tendonous tissues during first attempt, with fixation not able to be completed. Underwent a second procedure on 12/28/23 with a graft to secure the pec tendon, which was successful. Patient is a very active individual, lifting weights 5-6 days per week, and has been lifting for the last few weeks since procedure. Has not initated any pec strengthening, as he wanted to see PT before initiating said movements. Patient's gross strength is exceptional for his status following surgical repair, likely attributed to his strong baseline of strength before the procedure. Initiated BFR today, allowing us to isolate pec musculature without applying substantial loads to the tendon. Patient reponded well and stated that these motions felt good with no complaints of pain or discomfort. Also analyzed push up form, with patient distributing weight evenly between both UE and achieving depth without compensatory patterns. Instructed patient to begin to integrate light chest workouts (cable flies, front raises, etc), starting with very low weight. Pt would benefit from physical therapy to address the impairments found & listed previously in the objective section in order to return to safe and pain-free ADLs and prior level of function.       Plan:     Planned Interventions  include: therapeutic exercise, self-care home management, manual therapy, therapeutic activities, gait training, neuromuscular coordination, vasopneumatic, dry needling, aquatic therapy  Frequency: 1 x Week  Duration: 8 Weeks      JOAQUINA GILPT

## 2024-02-07 PROBLEM — M62.81 MUSCLE WEAKNESS: Status: ACTIVE | Noted: 2024-02-07

## 2024-02-07 ASSESSMENT — ENCOUNTER SYMPTOMS
OCCASIONAL FEELINGS OF UNSTEADINESS: 0
LOSS OF SENSATION IN FEET: 0
DEPRESSION: 0

## 2024-03-06 ENCOUNTER — OFFICE VISIT (OUTPATIENT)
Dept: ORTHOPEDIC SURGERY | Facility: CLINIC | Age: 34
End: 2024-03-06
Payer: COMMERCIAL

## 2024-03-06 DIAGNOSIS — S29.011A TRAUMATIC RUPTURE OF RIGHT PECTORALIS MAJOR TENDON: ICD-10-CM

## 2024-03-06 PROCEDURE — 99024 POSTOP FOLLOW-UP VISIT: CPT | Performed by: STUDENT IN AN ORGANIZED HEALTH CARE EDUCATION/TRAINING PROGRAM

## 2024-03-06 PROCEDURE — 1036F TOBACCO NON-USER: CPT | Performed by: STUDENT IN AN ORGANIZED HEALTH CARE EDUCATION/TRAINING PROGRAM

## 2024-03-06 NOTE — PROGRESS NOTES
History: Patient returns to the office status post R allograft pec recon on 2 months ago. Patient has been working with therapy and pain is well controlled. Denies numbness/tingling.     Past medical history, past surgical history, medications, allergies, family history, social history, and review of systems were reviewed today and have been documented separately in this encounter.   A 12 point review of systems was negative other than as stated in the HPI.    Physical Examination:    On exam of the right upper extremity, incisions are well healed, without significant erythema or drainage. Demonstrates full ROM of the elbow/wrist/hand. Neurovascularly intact throughout. AROM of the shoulder today to ,. ER 70. Contour of pec is restored.     Assessment: 2 months s/p R allograft pec recon     Plan: Patient progressing as expected after recent surgery. Continue to work with PT. We will see them back around 4 months postoperatively. All questions answered.     Dragon software was used to dictate this note, please be aware that minor errors in transcription may be present.    Ike Merlos MD    Shoulder/Elbow Surgery  [facility]

## 2024-03-12 ENCOUNTER — TREATMENT (OUTPATIENT)
Dept: PHYSICAL THERAPY | Facility: HOSPITAL | Age: 34
End: 2024-03-12
Payer: COMMERCIAL

## 2024-03-12 DIAGNOSIS — M62.81 MUSCLE WEAKNESS: Primary | ICD-10-CM

## 2024-03-12 DIAGNOSIS — S29.011A TRAUMATIC RUPTURE OF RIGHT PECTORALIS MAJOR TENDON: ICD-10-CM

## 2024-03-12 PROCEDURE — 97535 SELF CARE MNGMENT TRAINING: CPT | Mod: GP | Performed by: PHYSICAL THERAPIST

## 2024-03-12 ASSESSMENT — PAIN SCALES - GENERAL: PAINLEVEL_OUTOF10: 0 - NO PAIN

## 2024-03-12 ASSESSMENT — PAIN - FUNCTIONAL ASSESSMENT: PAIN_FUNCTIONAL_ASSESSMENT: 0-10

## 2024-03-12 NOTE — PROGRESS NOTES
Physical Therapy  Physical Therapy Orthopedic Evaluation    Patient Name: Miguel Early  MRN: [id number]  Today's Date: 3/12/2024  Time Calculation  Start Time: 1000  Stop Time: 1100  Time Calculation (min): 60 min    Insurance:  Visit number: 2 of 20  Authorization info: No auth  Insurance Type: [employer]  POW: 10.5    General:  Reason for visit: S/p R pectoralis major tendon repair  Referred by: Ike Merlos    Current Problem  1. Muscle weakness        2. Traumatic rupture of right pectoralis major tendon            Precautions: None       Medical History Form: Reviewed (scanned into chart)    Subjective:   Patient presents to follow up s/p R pectoralis major tendon repair 10.5 weeks ago. He feels that things have been doing very well, progressing his lifting steadily over the last 5 weeks since last being seen in PT. Denies any pain or significant soreness. Presents to PT for follow up, wanting additional insight and opinions on his progress since surgery.     Pain:  Pain Assessment: 0-10  Pain Score: 0 - No pain    Objective:  Objective       ROM    Shoulder AROM (Degrees)      (R)  (L)  Flexion: 165  165   Abduction: WFL  WFL     Functional ER: WFL  WFL     Functional IR: WFL  WFL  90-90 ER: 85  85         Elbow AROM (Degrees)      (R)  (L)  Flexion: WNL  WNL      Extension: WNL  WNL           Strength Testing     Handheld Dyanamometer     (R)  (L)  Ant delt:  19.9  22.2  Middle delt:  22.6  23.4  Posterior delt:  23.4  27.5  Upper Traps:  25.5  28.5   Middle Traps:  13.9  18.8   Lower Traps:  13.2  16.9  Shoulder ER:  24.7  24.7   Shoulder IR:  35.7  34.5  Pec (sternal head): 29.6  33.9  Pec (clavicular head):27.6  33.2         Palpation: Minor TTP over pec major insertion    Observation: Incision healing well with no signs of infection. No keloid and generally healing well      Outcome Measures:           Goals: Set and discussed today  Active       PT Problem       PT Goals (Progressing)        Start:  02/07/24    Expected End:  05/01/24       Patient will demonstrate understanding of HEP within 2 visits in order to facilitate continuum of care during PT management.  Patient will demonstrate understanding of self scar mobilization within 2 visits in order to minimize adhesions to underlying tissues.   Patient will maintain R UE ROM (being symmetrical to that of the L UE) with increased physical activity, assisting with proper movement patterns and muscle activation through a full ROM.   Patient will improve R UE strength to be symmetrical to the L UE (as measured through HHD) by the end of PT POC in order to facilitate proper biomechanics with higher level athletic movements.             Plan of care was developed with input and agreement by the patient      Treatment Performed:    Self Care Management  60 min  Warm up, 3 mins forward 3 mins backward UE ergometer  ROM assessment x5 mins  HHD of b/l UE x15 mins  Education  Discussion of HHD findings  Exercise prescription  Education on tissue healing times and implications for rehab  Mobility exercises  Discussion of exercise form, target musculature, single arm exercises, etc      Assessment: Patient presents to PT for follow up regarding pectoralis tendon repair 10.5 weeks ago. Presented for initial eval about 5 weeks ago and has since been completing rehab independently based on recommendations from PT at initial eval. He feels that everything has been going well, reporting that gradual increases in resistance and working up to a 12-15 rep range before increasing resistance has been working well. He has also noticed some definition returning to his pec, as well as increased symmetry with amount of effort with contractions with various exercises. ROM of the UE is grossly symmetrical and patient shows no signs of compensatory motions that would skew the appearance of symmetry with range. Utilized HHD to provide objective data regarding patient's strength  values of both UE, aiming to provide insight into any strength imbalances or deficits he should focus on with his rehab/training. Generally, strength ratios and symmetry appeared as expected, with greatest deficits being present in the pec. However, these deficits are not wildly significant, and showcases that while there is still room for improvement in pec strengthening, he has made significant progress over the last 5 weeks. HHD also revealed some degree of asymmetry between the L and R trapezius muscle. Discussed importance of muscular balance between the anterior and posterior muscles working for the UE as well importance of periscapular muscle strength and stability with high level activities. Spent time educating patient on recommendations for rehab moving forward, as well as touching on concepts regarding exercise dosage and prescription. At this time, will plan to keep patient on caseload in order to provide a streamlined opportunity for future follow ups and consultation. Patient would benefit from continued PT management to assist with gradual progression into higher level activity and assist with consultation regarding appropriate rehab moving forward.       Plan:   Planned Interventions include: therautic exercise, self-care home management, manual therapy, therapeutic activities, gait training, neuromuscular coordination, vasopneumatic, dry needling, aquatic therapy  Frequency: 1 x Week  Duration: 8 Weeks      THIAGO GIL-PT

## 2024-05-07 ENCOUNTER — HOSPITAL ENCOUNTER (OUTPATIENT)
Dept: RADIOLOGY | Facility: CLINIC | Age: 34
Discharge: HOME | End: 2024-05-07
Payer: COMMERCIAL

## 2024-05-07 ENCOUNTER — OFFICE VISIT (OUTPATIENT)
Dept: ORTHOPEDIC SURGERY | Facility: CLINIC | Age: 34
End: 2024-05-07
Payer: COMMERCIAL

## 2024-05-07 DIAGNOSIS — S29.011A TRAUMATIC RUPTURE OF RIGHT PECTORALIS MAJOR TENDON: Primary | ICD-10-CM

## 2024-05-07 DIAGNOSIS — S29.011A TRAUMATIC RUPTURE OF RIGHT PECTORALIS MAJOR TENDON: ICD-10-CM

## 2024-05-07 PROCEDURE — 99212 OFFICE O/P EST SF 10 MIN: CPT | Performed by: STUDENT IN AN ORGANIZED HEALTH CARE EDUCATION/TRAINING PROGRAM

## 2024-05-07 NOTE — PROGRESS NOTES
History: Patient returns to the office status post R pectoralis reconstruction with allograft on 4 months ago. Patient has been doing exercises on his own and pain is well controlled. Denies numbness/tingling.     Past medical history, past surgical history, medications, allergies, family history, social history, and review of systems were reviewed today and have been documented separately in this encounter.   A 12 point review of systems was negative other than as stated in the HPI.    Physical Examination:    On exam of the right upper extremity, incisions are well healed, without significant erythema or drainage. Demonstrates full ROM of the elbow/wrist/hand. Neurovascularly intact throughout. AROM of the shoulder today to forward flexion to 180, external rotation to 70, internal rotation to T12.  Full strength with resisted forward flexion and external rotation.  Full strength of internal rotation.  Pectoralis major tendon is robust.       Assessment: 4 months s/p right pectoralis major reconstruction with allograft    Plan: Patient progressing well after surgery.  Has full range of motion and strength of shoulder today.  At this point, I am okay with him to get back to all of his desired activities.  He can gradually work up being doing all of the previous work as he was doing prior to his injury.  Told that he does not have a specific weight limit.  Did tell him that it is possible to reinjure this shoulder or the contralateral shoulder in the future given the nature of what he likes to do, but again, I do not have any limitations on him.  All questions were answered.  Follow-up as needed.     All questions answered.     Dragon software was used to dictate this note, please be aware that minor errors in transcription may be present.    Ike Merlos MD    Shoulder/Elbow Surgery  [facility]

## (undated) DEVICE — KIT, DISPOSABLES, FOR 2.6 FIBERTAK

## (undated) DEVICE — DRESSING, NON-ADHERENT, 3 X 3 IN, STERILE

## (undated) DEVICE — BLANKET, LOWER BODY, VHA PLUS, ADULT

## (undated) DEVICE — SUTURE, VICRYL, 2-0, 27 IN, SH, UNDYED

## (undated) DEVICE — DRESSING, ABDOMINAL, TENDERSORB, 8 X 7-1/2 IN, STERILE

## (undated) DEVICE — ADULT REM POLYHESIVE II PATIENT RETURN ELECTRODE W/9 FT (2.7 M) ATTACHED CORD

## (undated) DEVICE — TIP, SUCTION, YANKAUER, BULB, ADULT

## (undated) DEVICE — Device

## (undated) DEVICE — SUTURE, VICRYL, 0, 27 IN, CT-2, UNDYED

## (undated) DEVICE — SUTURE, MONOCRYL, 3-0, 27 IN, PS-2, UNDYED

## (undated) DEVICE — TUBING, SMOKE EVAC, 3/8 X 10 FT

## (undated) DEVICE — 22CM X 45CM, LARGE DELUXE ARM SLING W/PAD

## (undated) DEVICE — SOLUTION, IRRIGATION, X RX SODIUM CHL 0.9%, 1000ML BTL

## (undated) DEVICE — KIT, BEACH CHAIR TRIMANO

## (undated) DEVICE — CUFF, TOURNIQUET, 18 X 4, DUAL PORT/SNGL BLADDER, DISP, LF

## (undated) DEVICE — SUTURE, PROLENE, 3-0, 18 IN, PS2, BLUE

## (undated) DEVICE — TUBING, SUCTION, 6MM X 10, CLEAN N-COND

## (undated) DEVICE — STRIP, SKIN CLOSURE, STERI STRIP, REINFORCED, 0.5 X 4 IN

## (undated) DEVICE — PADDING, UNDERCAST, WEBRIL, 4 IN X 4 YD, REG, NS

## (undated) DEVICE — PENCIL, ELECTROSURG, W/BUTTON SWITCH & HOLSTER, EZ CLEAN, DISP

## (undated) DEVICE — GOWN, SURGICAL, SIRUS, NON REINFORCED, LARGE